# Patient Record
Sex: FEMALE | Race: WHITE | NOT HISPANIC OR LATINO | URBAN - METROPOLITAN AREA
[De-identification: names, ages, dates, MRNs, and addresses within clinical notes are randomized per-mention and may not be internally consistent; named-entity substitution may affect disease eponyms.]

---

## 2018-06-13 ENCOUNTER — TELEPHONE (OUTPATIENT)
Dept: SCHEDULING | Facility: CLINIC | Age: 78
End: 2018-06-13

## 2018-06-13 NOTE — TELEPHONE ENCOUNTER
Pt daughter calling to make sure the office receives the abnormal ekg.  Please Call Dr. Oleary to confirm [phone]

## 2019-01-17 ENCOUNTER — TRANSCRIBE ORDERS (OUTPATIENT)
Dept: ADMINISTRATIVE | Facility: HOSPITAL | Age: 79
End: 2019-01-17

## 2019-01-17 DIAGNOSIS — R60.0 LOCALIZED EDEMA: Primary | ICD-10-CM

## 2019-01-17 DIAGNOSIS — E87.70 HYPERVOLEMIA, UNSPECIFIED HYPERVOLEMIA TYPE: ICD-10-CM

## 2019-01-17 DIAGNOSIS — D86.9 SARCOIDOSIS: ICD-10-CM

## 2019-01-18 ENCOUNTER — HOSPITAL ENCOUNTER (OUTPATIENT)
Dept: NON INVASIVE DIAGNOSTICS | Facility: CLINIC | Age: 79
Discharge: HOME | End: 2019-01-18
Payer: MEDICARE

## 2019-01-18 DIAGNOSIS — D86.9 SARCOIDOSIS: ICD-10-CM

## 2019-01-18 DIAGNOSIS — E87.70 HYPERVOLEMIA, UNSPECIFIED HYPERVOLEMIA TYPE: ICD-10-CM

## 2019-01-18 DIAGNOSIS — R60.0 LOCALIZED EDEMA: ICD-10-CM

## 2019-01-18 PROCEDURE — 93306 TTE W/DOPPLER COMPLETE: CPT

## 2019-01-18 PROCEDURE — 93306 TTE W/DOPPLER COMPLETE: CPT | Performed by: INTERNAL MEDICINE

## 2021-01-21 ENCOUNTER — IMMUNIZATIONS (OUTPATIENT)
Dept: FAMILY MEDICINE CLINIC | Facility: HOSPITAL | Age: 81
End: 2021-01-21

## 2021-01-21 DIAGNOSIS — Z23 ENCOUNTER FOR IMMUNIZATION: Primary | ICD-10-CM

## 2021-01-21 PROCEDURE — 0001A SARS-COV-2 / COVID-19 MRNA VACCINE (PFIZER-BIONTECH) 30 MCG: CPT

## 2021-01-21 PROCEDURE — 91300 SARS-COV-2 / COVID-19 MRNA VACCINE (PFIZER-BIONTECH) 30 MCG: CPT

## 2021-02-10 ENCOUNTER — IMMUNIZATIONS (OUTPATIENT)
Dept: FAMILY MEDICINE CLINIC | Facility: HOSPITAL | Age: 81
End: 2021-02-10

## 2021-02-10 DIAGNOSIS — Z23 ENCOUNTER FOR IMMUNIZATION: Primary | ICD-10-CM

## 2021-02-10 PROCEDURE — 91300 SARS-COV-2 / COVID-19 MRNA VACCINE (PFIZER-BIONTECH) 30 MCG: CPT

## 2021-02-10 PROCEDURE — 0002A SARS-COV-2 / COVID-19 MRNA VACCINE (PFIZER-BIONTECH) 30 MCG: CPT

## 2025-01-21 ENCOUNTER — APPOINTMENT (EMERGENCY)
Dept: CT IMAGING | Facility: HOSPITAL | Age: 85
End: 2025-01-21
Payer: MEDICARE

## 2025-01-21 ENCOUNTER — APPOINTMENT (EMERGENCY)
Dept: RADIOLOGY | Facility: HOSPITAL | Age: 85
End: 2025-01-21
Payer: MEDICARE

## 2025-01-21 ENCOUNTER — HOSPITAL ENCOUNTER (EMERGENCY)
Facility: HOSPITAL | Age: 85
Discharge: HOME | End: 2025-01-21
Attending: EMERGENCY MEDICINE
Payer: MEDICARE

## 2025-01-21 VITALS
SYSTOLIC BLOOD PRESSURE: 172 MMHG | RESPIRATION RATE: 16 BRPM | HEIGHT: 64 IN | OXYGEN SATURATION: 95 % | BODY MASS INDEX: 20.49 KG/M2 | WEIGHT: 120 LBS | DIASTOLIC BLOOD PRESSURE: 102 MMHG | HEART RATE: 51 BPM | TEMPERATURE: 97.8 F

## 2025-01-21 DIAGNOSIS — R25.1 TREMOR: Primary | ICD-10-CM

## 2025-01-21 LAB
2HR DELTA HS TROPONIN: 4 NG/L
ALBUMIN SERPL BCG-MCNC: 3.9 G/DL (ref 3.5–5)
ALP SERPL-CCNC: 47 U/L (ref 34–104)
ALT SERPL W P-5'-P-CCNC: 7 U/L (ref 7–52)
ANION GAP SERPL CALCULATED.3IONS-SCNC: 8 MMOL/L (ref 4–13)
AST SERPL W P-5'-P-CCNC: 18 U/L (ref 13–39)
ATRIAL RATE: 58 BPM
BASOPHILS # BLD AUTO: 0.07 THOUSANDS/ΜL (ref 0–0.1)
BASOPHILS NFR BLD AUTO: 1 % (ref 0–1)
BILIRUB SERPL-MCNC: 0.36 MG/DL (ref 0.2–1)
BUN SERPL-MCNC: 16 MG/DL (ref 5–25)
CALCIUM SERPL-MCNC: 9.1 MG/DL (ref 8.4–10.2)
CARDIAC TROPONIN I PNL SERPL HS: 10 NG/L (ref ?–50)
CARDIAC TROPONIN I PNL SERPL HS: 6 NG/L (ref ?–50)
CHLORIDE SERPL-SCNC: 108 MMOL/L (ref 96–108)
CK SERPL-CCNC: 80 U/L (ref 26–192)
CO2 SERPL-SCNC: 23 MMOL/L (ref 21–32)
CREAT SERPL-MCNC: 0.71 MG/DL (ref 0.6–1.3)
EOSINOPHIL # BLD AUTO: 0.23 THOUSAND/ΜL (ref 0–0.61)
EOSINOPHIL NFR BLD AUTO: 3 % (ref 0–6)
ERYTHROCYTE [DISTWIDTH] IN BLOOD BY AUTOMATED COUNT: 13.5 % (ref 11.6–15.1)
FLUAV AG UPPER RESP QL IA.RAPID: NEGATIVE
FLUBV AG UPPER RESP QL IA.RAPID: NEGATIVE
GFR SERPL CREATININE-BSD FRML MDRD: 78 ML/MIN/1.73SQ M
GLUCOSE SERPL-MCNC: 104 MG/DL (ref 65–140)
HCT VFR BLD AUTO: 41 % (ref 34.8–46.1)
HGB BLD-MCNC: 13.9 G/DL (ref 11.5–15.4)
IMM GRANULOCYTES # BLD AUTO: 0.03 THOUSAND/UL (ref 0–0.2)
IMM GRANULOCYTES NFR BLD AUTO: 0 % (ref 0–2)
LYMPHOCYTES # BLD AUTO: 1.4 THOUSANDS/ΜL (ref 0.6–4.47)
LYMPHOCYTES NFR BLD AUTO: 17 % (ref 14–44)
MCH RBC QN AUTO: 30.9 PG (ref 26.8–34.3)
MCHC RBC AUTO-ENTMCNC: 33.9 G/DL (ref 31.4–37.4)
MCV RBC AUTO: 91 FL (ref 82–98)
MONOCYTES # BLD AUTO: 0.84 THOUSAND/ΜL (ref 0.17–1.22)
MONOCYTES NFR BLD AUTO: 10 % (ref 4–12)
NEUTROPHILS # BLD AUTO: 5.58 THOUSANDS/ΜL (ref 1.85–7.62)
NEUTS SEG NFR BLD AUTO: 69 % (ref 43–75)
NRBC BLD AUTO-RTO: 0 /100 WBCS
P AXIS: 59 DEGREES
PLATELET # BLD AUTO: 193 THOUSANDS/UL (ref 149–390)
PMV BLD AUTO: 10.5 FL (ref 8.9–12.7)
POTASSIUM SERPL-SCNC: 3.6 MMOL/L (ref 3.5–5.3)
PR INTERVAL: 180 MS
PROT SERPL-MCNC: 6.5 G/DL (ref 6.4–8.4)
QRS AXIS: 62 DEGREES
QRSD INTERVAL: 86 MS
QT INTERVAL: 438 MS
QTC INTERVAL: 429 MS
RBC # BLD AUTO: 4.5 MILLION/UL (ref 3.81–5.12)
SARS-COV+SARS-COV-2 AG RESP QL IA.RAPID: NEGATIVE
SODIUM SERPL-SCNC: 139 MMOL/L (ref 135–147)
T WAVE AXIS: 56 DEGREES
VENTRICULAR RATE: 58 BPM
WBC # BLD AUTO: 8.15 THOUSAND/UL (ref 4.31–10.16)

## 2025-01-21 PROCEDURE — 80053 COMPREHEN METABOLIC PANEL: CPT

## 2025-01-21 PROCEDURE — 36415 COLL VENOUS BLD VENIPUNCTURE: CPT

## 2025-01-21 PROCEDURE — 71046 X-RAY EXAM CHEST 2 VIEWS: CPT

## 2025-01-21 PROCEDURE — 82550 ASSAY OF CK (CPK): CPT

## 2025-01-21 PROCEDURE — 93010 ELECTROCARDIOGRAM REPORT: CPT | Performed by: INTERNAL MEDICINE

## 2025-01-21 PROCEDURE — 93005 ELECTROCARDIOGRAM TRACING: CPT

## 2025-01-21 PROCEDURE — 70450 CT HEAD/BRAIN W/O DYE: CPT

## 2025-01-21 PROCEDURE — 85025 COMPLETE CBC W/AUTO DIFF WBC: CPT

## 2025-01-21 PROCEDURE — 99285 EMERGENCY DEPT VISIT HI MDM: CPT

## 2025-01-21 PROCEDURE — 87804 INFLUENZA ASSAY W/OPTIC: CPT

## 2025-01-21 PROCEDURE — 87811 SARS-COV-2 COVID19 W/OPTIC: CPT

## 2025-01-21 PROCEDURE — 84484 ASSAY OF TROPONIN QUANT: CPT

## 2025-01-21 NOTE — ED PROVIDER NOTES
Time reflects when diagnosis was documented in both MDM as applicable and the Disposition within this note       Time User Action Codes Description Comment    1/21/2025  6:05 AM Carter Hollingsworth Add [R25.1] Tremor           ED Disposition       ED Disposition   Discharge    Condition   Stable    Date/Time   Tue Jan 21, 2025  6:05 AM    Comment   Jamila Perla discharge to home/self care.                   Assessment & Plan       Medical Decision Making  84-year-old female presents to ED for evaluation of extremity tremoring as seen in HPI.  On physical examination patient vital signs stable.  Mild hypertension of 171/73.  Nontachycardic.  Afebrile.  Nonhypoxic.  Nontoxic-appearing.  Patient in no apparent distress.  No active tremoring on examination.  No neurodeficits.  Pupils equal and reactive.  No facial droop.  No slurred speech.  NIH 0.  Moving all 4 extremities without difficulty.  No murmur.  Normal breath sounds.  No rash.  Very reassuring examination.  Obtained workup consisting of CBC, CMP, CK, flu/COVID swab, troponin, EKG, chest x-ray, Noncon head CT.  Differential diagnosis includes but not limited to carpopedal spasming due to hyperventilation, electrolyte abnormality, rhabdomyolysis, MI, ACS, pneumonia, pneumothorax, tension pneumothorax, malignancy, anxiety, seizure    CBC WNL.  CMP WNL.  CK WNL.  Flu/COVID swab negative.  Troponin of 6 NG/L at 0-hour.  2-hour troponin of 10 NG/L.  EKG without evidence of acute cardiac injury, arrhythmia.  Chest x-ray without evidence of acute cardiopulmonary disease.  Noncon head CT without acute abnormality.    Monitored patient in the ED for several hours.  No repeat symptoms occurred.  Discussed results of workup with patient.  Feel patient can be safely discharged at this time.  Strongly suspect that patient experienced carpopedal spasming due to hyperventilation.  No history of seizure disorders.  No intracranial lesion seen on head CT.  Plan to discharge with  "instruction to follow-up with PCP in the coming days to assess for repeat of symptoms, guidance on further workup as needed.  Patient agreeable to plan.  Patient discharged.    Prior to discharge, discharge instructions were discussed with patient at bedside. Patient was provided both verbal and written instructions. Patient is understanding of the discharge instructions and is agreeable to plan of care. Return precautions were discussed with patient bedside, patient verbalized understanding of signs and symptoms that would necessitate return to the ED. All questions were answered. Patient was comfortable with the plan of care and discharged to home.    Portions of this chart may have been written with voice recognition software.  Occasional grammatical errors, wrong word or \"sound a like\" substitutions may have occurred due to software limitations.  Please read carefully and use context to recognize where substitutions have occurred.     Amount and/or Complexity of Data Reviewed  Labs: ordered.  Radiology: ordered and independent interpretation performed.             Medications - No data to display    ED Risk Strat Scores   HEART Risk Score      Flowsheet Row Most Recent Value   Heart Score Risk Calculator    History 0 Filed at: 01/21/2025 0604   ECG 0 Filed at: 01/21/2025 0604   Age 2 Filed at: 01/21/2025 0604   Risk Factors 1 Filed at: 01/21/2025 0604   Troponin 0 Filed at: 01/21/2025 0604   HEART Score 3 Filed at: 01/21/2025 0604          HEART Risk Score      Flowsheet Row Most Recent Value   Heart Score Risk Calculator    History 0 Filed at: 01/21/2025 0604   ECG 0 Filed at: 01/21/2025 0604   Age 2 Filed at: 01/21/2025 0604   Risk Factors 1 Filed at: 01/21/2025 0604   Troponin 0 Filed at: 01/21/2025 0604   HEART Score 3 Filed at: 01/21/2025 0604                    Identification of Seniors at Risk      Flowsheet Row Most Recent Value   (ISAR) Identification of Seniors at Risk    Before the illness or injury " "that brought you to the Emergency, did you need someone to help you on a regular basis? 0 Filed at: 01/21/2025 0119   In the last 24 hours, have you needed more help than usual? 1 Filed at: 01/21/2025 0119   Have you been hospitalized for one or more nights during the past 6 months? 0 Filed at: 01/21/2025 0119   In general, do you see well? 0 Filed at: 01/21/2025 0119   In general, do you have serious problems with your memory? 0 Filed at: 01/21/2025 0119   Do you take more than three different medications every day? 1 Filed at: 01/21/2025 0119   ISAR Score 2 Filed at: 01/21/2025 0119                SBIRT 22yo+      Flowsheet Row Most Recent Value   Initial Alcohol Screen:  AUDIT-C     1. How often do you have a drink containing alcohol? 0 Filed at: 01/21/2025 0119   2. How many drinks containing alcohol do you have on a typical day you are drinking?  1 Filed at: 01/21/2025 0119   3b. FEMALE Any Age, or MALE 65+: How often do you have 4 or more drinks on one occassion? 0 Filed at: 01/21/2025 0119   Audit-C Score 1 Filed at: 01/21/2025 0119   MARGARITA: How many times in the past year have you...    Used an illegal drug or used a prescription medication for non-medical reasons? Never Filed at: 01/21/2025 0119                            History of Present Illness       Chief Complaint   Patient presents with    Tremors     Patient arrives with family and per family she is SOB and has diminished lung capacity. Per family she has been having tremors that began when she woke up around midnight. Patient denies SOB, lightheaded. Per spouse gave her a Xanax that is \" 4 years old\". Pmhx of sarcoidosis       Past Medical History:   Diagnosis Date    Sarcoidosis       History reviewed. No pertinent surgical history.   History reviewed. No pertinent family history.   Social History     Tobacco Use    Smoking status: Never    Smokeless tobacco: Never   Substance Use Topics    Alcohol use: Never    Drug use: Never    "   E-Cigarette/Vaping      E-Cigarette/Vaping Substances      I have reviewed and agree with the history as documented.     84-year-old female with history of sarcoidosis presents to ED for evaluation of tremors.  Patient accompanied by her family.  Patient's son explains that around midnight patient woke up and described having sensation of discomfort at the base of her throat, shortness of breath.  Patient went into a panic and started breathing rapidly.  She subsequently developed spasming of her extremities.  Patient was sitting upright during the spasming.  Patient was talking to her son during the spasming.  Denies history of seizures.  Patient was subsequently given a Xanax by her .  The symptoms lasted for several minutes.  No postictal state.  Patient currently asymptomatic in the ED. Patient denies recent fall, head strike.  Denies chest pain, syncope, blurred vision, speech difficulty, facial droop, ambulatory dysfunction, abdominal pain, dysuria, hematuria, increased urinary frequency, fever, chills.  No other concerns today.              Review of Systems   Neurological:  Positive for tremors. Negative for dizziness, seizures, syncope, facial asymmetry, speech difficulty, weakness, light-headedness, numbness and headaches.   All other systems reviewed and are negative.          Objective       ED Triage Vitals   Temperature Pulse Blood Pressure Respirations SpO2 Patient Position - Orthostatic VS   01/21/25 0114 01/21/25 0114 01/21/25 0230 01/21/25 0114 01/21/25 0114 01/21/25 0600   97.8 °F (36.6 °C) 68 (!) 175/75 22 98 % Lying      Temp src Heart Rate Source BP Location FiO2 (%) Pain Score    -- 01/21/25 0114 01/21/25 0600 -- 01/21/25 0114     Monitor Right arm  No Pain      Vitals      Date and Time Temp Pulse SpO2 Resp BP Pain Score FACES Pain Rating User   01/21/25 0600 -- 51 95 % 16 172/102 provider marie e -- --    01/21/25 0500 -- 55 97 % 16 158/71 -- -- KG   01/21/25 0430 -- 53 94 % 18  171/73 -- -- KG   01/21/25 0400 -- 54 94 % 18 171/77 -- -- KG   01/21/25 0230 -- 61 96 % 20 175/75 -- -- KG   01/21/25 0114 97.8 °F (36.6 °C) 68 98 % 22 -- No Pain -- KM            Physical Exam  Vitals and nursing note reviewed.   Constitutional:       General: She is not in acute distress.     Appearance: Normal appearance. She is well-developed. She is not ill-appearing, toxic-appearing or diaphoretic.   HENT:      Head: Normocephalic and atraumatic.      Nose: Nose normal.   Eyes:      Conjunctiva/sclera: Conjunctivae normal.   Cardiovascular:      Rate and Rhythm: Normal rate and regular rhythm.      Pulses: Normal pulses.      Heart sounds: Normal heart sounds. No murmur heard.     No friction rub. No gallop.   Pulmonary:      Effort: Pulmonary effort is normal. No respiratory distress.      Breath sounds: Normal breath sounds. No stridor. No wheezing, rhonchi or rales.   Abdominal:      General: There is no distension.      Palpations: Abdomen is soft. There is no mass.      Tenderness: There is no abdominal tenderness. There is no guarding or rebound.      Hernia: No hernia is present.   Musculoskeletal:         General: No swelling.      Cervical back: Neck supple.   Skin:     General: Skin is warm and dry.      Capillary Refill: Capillary refill takes less than 2 seconds.      Coloration: Skin is not jaundiced or pale.      Findings: No bruising, lesion or rash.   Neurological:      General: No focal deficit present.      Mental Status: She is alert and oriented to person, place, and time. Mental status is at baseline.      GCS: GCS eye subscore is 4. GCS verbal subscore is 5. GCS motor subscore is 6.      Cranial Nerves: Cranial nerves 2-12 are intact. No cranial nerve deficit.      Sensory: Sensation is intact. No sensory deficit.      Motor: Motor function is intact. No weakness.      Coordination: Coordination is intact. Coordination normal.      Gait: Gait is intact. Gait normal.   Psychiatric:          Mood and Affect: Mood normal.         Results Reviewed       Procedure Component Value Units Date/Time    HS Troponin I 2hr [[id number]]  (Normal) Collected: 01/21/25 0523    Lab Status: Final result Specimen: Blood from Hand, Right Updated: 01/21/25 0551     hs TnI 2hr 10 ng/L      Delta 2hr hsTnI 4 ng/L     HS Troponin 0hr (reflex protocol) [[id number]]  (Normal) Collected: 01/21/25 0254    Lab Status: Final result Specimen: Blood from Arm, Right Updated: 01/21/25 0322     hs TnI 0hr 6 ng/L     FLU/COVID Rapid Antigen (30 min. TAT) - Preferred screening test in ED [[id number]]  (Normal) Collected: 01/21/25 0254    Lab Status: Final result Specimen: Nares from Nose Updated: 01/21/25 0318     SARS COV Rapid Antigen Negative     Influenza A Rapid Antigen Negative     Influenza B Rapid Antigen Negative    Narrative:      This test has been performed using the [employer] Latrice 2 FLU+SARS Antigen test under the Emergency Use Authorization (EUA). This test has been validated by the  and verified by the performing laboratory. The Latrice uses lateral flow immunofluorescent sandwich assay to detect SARS-COV, Influenza A and Influenza B Antigen.     The [employer]idel Latrice 2 SARS Antigen test does not differentiate between SARS-CoV and SARS-CoV-2.     Negative results are presumptive and may be confirmed with a molecular assay, if necessary, for patient management. Negative results do not rule out SARS-CoV-2 or influenza infection and should not be used as the sole basis for treatment or patient management decisions. A negative test result may occur if the level of antigen in a sample is below the limit of detection of this test.     Positive results are indicative of the presence of viral antigens, but do not rule out bacterial infection or co-infection with other viruses.     All test results should be used as an adjunct to clinical observations and other information available to the provider.    FOR PEDIATRIC PATIENTS -  copy/paste COVID Guidelines URL to browser: [email or web address]    Comprehensive metabolic panel [[id number]] Collected: 01/21/25 0254    Lab Status: Final result Specimen: Blood from Arm, Right Updated: 01/21/25 0315     Sodium 139 mmol/L      Potassium 3.6 mmol/L      Chloride 108 mmol/L      CO2 23 mmol/L      ANION GAP 8 mmol/L      BUN 16 mg/dL      Creatinine 0.71 mg/dL      Glucose 104 mg/dL      Calcium 9.1 mg/dL      AST 18 U/L      ALT 7 U/L      Alkaline Phosphatase 47 U/L      Total Protein 6.5 g/dL      Albumin 3.9 g/dL      Total Bilirubin 0.36 mg/dL      eGFR 78 ml/min/1.73sq m     Narrative:      National Kid[employer] guidelines for Chronic Kidney Disease (CKD):     Stage 1 with normal or high GFR (GFR > 90 mL/min/1.73 square meters)    Stage 2 Mild CKD (GFR = 60-89 mL/min/1.73 square meters)    Stage 3A Moderate CKD (GFR = 45-59 mL/min/1.73 square meters)    Stage 3B Moderate CKD (GFR = 30-44 mL/min/1.73 square meters)    Stage 4 Severe CKD (GFR = 15-29 mL/min/1.73 square meters)    Stage 5 End Stage CKD (GFR <15 mL/min/1.73 square meters)  Note: GFR calculation is accurate only with a steady state creatinine    CK [[id number]]  (Normal) Collected: 01/21/25 0254    Lab Status: Final result Specimen: Blood from Arm, Right Updated: 01/21/25 0315     Total CK 80 U/L     CBC and differential [[id number]] Collected: 01/21/25 0254    Lab Status: Final result Specimen: Blood from Arm, Right Updated: 01/21/25 0300     WBC 8.15 Thousand/uL      RBC 4.50 Million/uL      Hemoglobin 13.9 g/dL      Hematocrit 41.0 %      MCV 91 fL      MCH 30.9 pg      MCHC 33.9 g/dL      RDW 13.5 %      MPV 10.5 fL      Platelets 193 Thousands/uL      nRBC 0 /100 WBCs      Segmented % 69 %      Immature Grans % 0 %      Lymphocytes % 17 %      Monocytes % 10 %      Eosinophils Relative 3 %      Basophils Relative 1 %      Absolute Neutrophils 5.58 Thousands/µL       Absolute Immature Grans 0.03 Thousand/uL      Absolute Lymphocytes 1.40 Thousands/µL      Absolute Monocytes 0.84 Thousand/µL      Eosinophils Absolute 0.23 Thousand/µL      Basophils Absolute 0.07 Thousands/µL             CT head without contrast   Final Interpretation by Caden Mckeon DO (01/21 0435)      No acute intracranial abnormality.                  Workstation performed: [id number]         XR chest 2 views   ED Interpretation by Carter Hollingsworth PA-C (01/21 0303)   No acute cardiopulmonary disease on personal interpretation      Final Interpretation by Kit Esquivel MD (01/21 1106)      No acute cardiopulmonary disease.      Perihilar opacities and calcifications with hilar retraction consistent with history of sarcoidosis.               Resident: Walker Bear I, the attending radiologist, have reviewed the images and agree with the final report above.      Workstation performed: [id number]             ECG 12 Lead Documentation Only    Date/Time: 1/21/2025 2:55 AM    Performed by: Carter Hollingsworth PA-C  Authorized by: Carter Hollingsworth PA-C    Indications / Diagnosis:  Tremors  Patient location:  ED  Previous ECG:     Previous ECG:  Unavailable  Interpretation:     Interpretation: non-specific    Rate:     ECG rate:  58    ECG rate assessment: bradycardic    Rhythm:     Rhythm: sinus bradycardia    Ectopy:     Ectopy: none    QRS:     QRS axis:  Normal    QRS intervals:  Normal  Conduction:     Conduction: normal    ST segments:     ST segments:  Normal  T waves:     T waves: normal        ED Medication and Procedure Management   None     There are no discharge medications for this patient.    No discharge procedures on file.  ED SEPSIS DOCUMENTATION   Time reflects when diagnosis was documented in both MDM as applicable and the Disposition within this note       Time User Action Codes Description Comment    1/21/2025  6:05 AM Carter Hollingsworth Add [R25.1] Tremor                   Carter Hollingsworth PA-C  01/21/25 3229

## 2025-01-21 NOTE — DISCHARGE INSTRUCTIONS
Please follow-up with your primary care provider in the next couple days to discuss symptoms that occurred this past evening.  Please do return to ED for new or worsening symptoms.